# Patient Record
Sex: FEMALE | Race: WHITE | NOT HISPANIC OR LATINO | Employment: OTHER | URBAN - METROPOLITAN AREA
[De-identification: names, ages, dates, MRNs, and addresses within clinical notes are randomized per-mention and may not be internally consistent; named-entity substitution may affect disease eponyms.]

---

## 2023-07-06 ENCOUNTER — CLAIMS CREATED FROM THE CLAIM WINDOW (OUTPATIENT)
Dept: URBAN - METROPOLITAN AREA MEDICAL CENTER 19 | Facility: MEDICAL CENTER | Age: 78
End: 2023-07-06
Payer: MEDICARE

## 2023-07-06 DIAGNOSIS — R74.01 ABNORMAL/ELEVATED TRANSAMINASE (SGOT, AMINOTRANSFERASE): ICD-10-CM

## 2023-07-06 DIAGNOSIS — K83.1 OBSTRUCTION OF BILE DUCT: ICD-10-CM

## 2023-07-06 DIAGNOSIS — K83.8 COMMON BILE DUCT DILATION: ICD-10-CM

## 2023-07-06 DIAGNOSIS — R79.89 ABNORMAL BILIRUBIN TEST: ICD-10-CM

## 2023-07-06 DIAGNOSIS — R93.2 ABN FIND-BILIARY TRACT: ICD-10-CM

## 2023-07-06 DIAGNOSIS — R74.8 ABNORMAL ALKALINE PHOSPHATASE TEST: ICD-10-CM

## 2023-07-06 PROCEDURE — 99221 1ST HOSP IP/OBS SF/LOW 40: CPT | Performed by: INTERNAL MEDICINE

## 2023-07-06 PROCEDURE — 43274 ERCP DUCT STENT PLACEMENT: CPT | Performed by: INTERNAL MEDICINE

## 2023-07-07 ENCOUNTER — CLAIMS CREATED FROM THE CLAIM WINDOW (OUTPATIENT)
Dept: URBAN - METROPOLITAN AREA MEDICAL CENTER 19 | Facility: MEDICAL CENTER | Age: 78
End: 2023-07-07
Payer: MEDICARE

## 2023-07-07 DIAGNOSIS — R10.11 RUQ ABDOMINAL PAIN: ICD-10-CM

## 2023-07-07 DIAGNOSIS — R14.0 BLOAT: ICD-10-CM

## 2023-07-07 DIAGNOSIS — K83.09 ASCENDING CHOLANGITIS: ICD-10-CM

## 2023-07-07 DIAGNOSIS — K59.09 CHRONIC CONSTIPATION: ICD-10-CM

## 2023-07-07 PROCEDURE — 99232 SBSQ HOSP IP/OBS MODERATE 35: CPT | Performed by: STUDENT IN AN ORGANIZED HEALTH CARE EDUCATION/TRAINING PROGRAM

## 2023-07-08 ENCOUNTER — CLAIMS CREATED FROM THE CLAIM WINDOW (OUTPATIENT)
Dept: URBAN - METROPOLITAN AREA MEDICAL CENTER 19 | Facility: MEDICAL CENTER | Age: 78
End: 2023-07-08
Payer: MEDICARE

## 2023-07-08 DIAGNOSIS — D72.829 ELEVATED WHITE BLOOD CELL COUNT: ICD-10-CM

## 2023-07-08 DIAGNOSIS — K83.1 OBSTRUCTION OF BILE DUCT: ICD-10-CM

## 2023-07-08 DIAGNOSIS — R74.01 ABNORMAL/ELEVATED TRANSAMINASE (SGOT, AMINOTRANSFERASE): ICD-10-CM

## 2023-07-08 DIAGNOSIS — R79.89 ABNORMAL BILIRUBIN TEST: ICD-10-CM

## 2023-07-08 DIAGNOSIS — R74.8 ABNORMAL ALKALINE PHOSPHATASE TEST: ICD-10-CM

## 2023-07-08 PROCEDURE — 99232 SBSQ HOSP IP/OBS MODERATE 35: CPT | Performed by: INTERNAL MEDICINE

## 2023-07-09 ENCOUNTER — CLAIMS CREATED FROM THE CLAIM WINDOW (OUTPATIENT)
Dept: URBAN - METROPOLITAN AREA MEDICAL CENTER 19 | Facility: MEDICAL CENTER | Age: 78
End: 2023-07-09
Payer: MEDICARE

## 2023-07-09 DIAGNOSIS — K91.89 AFFERENT LOOP SYNDROME: ICD-10-CM

## 2023-07-09 DIAGNOSIS — K56.7 ILEUS: ICD-10-CM

## 2023-07-09 DIAGNOSIS — K85.80 OTHER ACUTE PANCREATITIS: ICD-10-CM

## 2023-07-09 DIAGNOSIS — K83.1 OBSTRUCTION OF BILE DUCT: ICD-10-CM

## 2023-07-09 PROCEDURE — 99233 SBSQ HOSP IP/OBS HIGH 50: CPT | Performed by: INTERNAL MEDICINE

## 2023-07-09 PROCEDURE — 99232 SBSQ HOSP IP/OBS MODERATE 35: CPT | Performed by: INTERNAL MEDICINE

## 2023-07-10 ENCOUNTER — CLAIMS CREATED FROM THE CLAIM WINDOW (OUTPATIENT)
Dept: URBAN - METROPOLITAN AREA MEDICAL CENTER 19 | Facility: MEDICAL CENTER | Age: 78
End: 2023-07-10
Payer: MEDICARE

## 2023-07-10 DIAGNOSIS — E80.6 HYPERBILIRUBINEMIA: ICD-10-CM

## 2023-07-10 DIAGNOSIS — E87.6 HYPOKALEMIA: ICD-10-CM

## 2023-07-10 DIAGNOSIS — K83.09 CHOLANGITIS: ICD-10-CM

## 2023-07-10 DIAGNOSIS — J96.01 ACUTE RESPIRATORY FAILURE WITH HYPOXEMIA: ICD-10-CM

## 2023-07-10 DIAGNOSIS — K85.01 IDIOPATHIC ACUTE PANCREATITIS WITH UNINFECTED NECROSIS: ICD-10-CM

## 2023-07-10 PROCEDURE — 99233 SBSQ HOSP IP/OBS HIGH 50: CPT | Performed by: STUDENT IN AN ORGANIZED HEALTH CARE EDUCATION/TRAINING PROGRAM

## 2023-07-10 PROCEDURE — 99232 SBSQ HOSP IP/OBS MODERATE 35: CPT | Performed by: STUDENT IN AN ORGANIZED HEALTH CARE EDUCATION/TRAINING PROGRAM

## 2023-07-11 ENCOUNTER — CLAIMS CREATED FROM THE CLAIM WINDOW (OUTPATIENT)
Dept: URBAN - METROPOLITAN AREA MEDICAL CENTER 19 | Facility: MEDICAL CENTER | Age: 78
End: 2023-07-11
Payer: MEDICARE

## 2023-07-11 DIAGNOSIS — K83.1 OBSTRUCTION OF BILE DUCT: ICD-10-CM

## 2023-07-11 DIAGNOSIS — K85.01 IDIOPATHIC ACUTE PANCREATITIS WITH UNINFECTED NECROSIS: ICD-10-CM

## 2023-07-11 DIAGNOSIS — N17.9 AKI (ACUTE KIDNEY INJURY): ICD-10-CM

## 2023-07-11 DIAGNOSIS — J96.01 ACUTE RESPIRATORY FAILURE WITH HYPOXEMIA: ICD-10-CM

## 2023-07-11 DIAGNOSIS — K83.09 ASCENDING CHOLANGITIS: ICD-10-CM

## 2023-07-11 PROCEDURE — 99232 SBSQ HOSP IP/OBS MODERATE 35: CPT | Performed by: STUDENT IN AN ORGANIZED HEALTH CARE EDUCATION/TRAINING PROGRAM

## 2023-07-11 PROCEDURE — 99233 SBSQ HOSP IP/OBS HIGH 50: CPT | Performed by: STUDENT IN AN ORGANIZED HEALTH CARE EDUCATION/TRAINING PROGRAM

## 2023-07-12 ENCOUNTER — CLAIMS CREATED FROM THE CLAIM WINDOW (OUTPATIENT)
Dept: URBAN - METROPOLITAN AREA MEDICAL CENTER 19 | Facility: MEDICAL CENTER | Age: 78
End: 2023-07-12
Payer: MEDICARE

## 2023-07-12 DIAGNOSIS — K85.80 OTHER ACUTE PANCREATITIS: ICD-10-CM

## 2023-07-12 DIAGNOSIS — K83.09 ASCENDING CHOLANGITIS: ICD-10-CM

## 2023-07-12 DIAGNOSIS — K91.89 OTHER POSTOPERATIVE COMPLICATION INVOLVING DIGESTIVE SYSTEM: ICD-10-CM

## 2023-07-12 DIAGNOSIS — J96.01 ACUTE RESPIRATORY FAILURE WITH HYPOXIA: ICD-10-CM

## 2023-07-12 DIAGNOSIS — K56.7 ILEUS: ICD-10-CM

## 2023-07-12 DIAGNOSIS — N17.9 ACUTE RENAL FAILURE: ICD-10-CM

## 2023-07-12 PROCEDURE — 99233 SBSQ HOSP IP/OBS HIGH 50: CPT | Performed by: INTERNAL MEDICINE

## 2023-07-12 PROCEDURE — 99232 SBSQ HOSP IP/OBS MODERATE 35: CPT | Performed by: INTERNAL MEDICINE

## 2023-07-13 ENCOUNTER — CLAIMS CREATED FROM THE CLAIM WINDOW (OUTPATIENT)
Dept: URBAN - METROPOLITAN AREA MEDICAL CENTER 19 | Facility: MEDICAL CENTER | Age: 78
End: 2023-07-13
Payer: MEDICARE

## 2023-07-13 DIAGNOSIS — K83.09 ASCENDING CHOLANGITIS: ICD-10-CM

## 2023-07-13 DIAGNOSIS — K86.89 ACUTE PANCREATIC FLUID COLLECTION: ICD-10-CM

## 2023-07-13 DIAGNOSIS — K85.81 OTHER ACUTE PANCREATITIS WITH UNINFECTED NECROSIS: ICD-10-CM

## 2023-07-13 DIAGNOSIS — R18.8 ASCITES: ICD-10-CM

## 2023-07-13 DIAGNOSIS — K56.7 ILEUS: ICD-10-CM

## 2023-07-13 DIAGNOSIS — K91.89 OTHER POSTOPERATIVE COMPLICATION INVOLVING DIGESTIVE SYSTEM: ICD-10-CM

## 2023-07-13 DIAGNOSIS — J96.01 ACUTE RESPIRATORY FAILURE WITH HYPOXIA: ICD-10-CM

## 2023-07-13 PROCEDURE — 99232 SBSQ HOSP IP/OBS MODERATE 35: CPT | Performed by: INTERNAL MEDICINE

## 2023-07-14 ENCOUNTER — CLAIMS CREATED FROM THE CLAIM WINDOW (OUTPATIENT)
Dept: URBAN - METROPOLITAN AREA MEDICAL CENTER 19 | Facility: MEDICAL CENTER | Age: 78
End: 2023-07-14
Payer: MEDICARE

## 2023-07-14 DIAGNOSIS — K91.89 OTH POSTPROCEDURAL COMPLICATIONS AND DISORDERS OF DGSTV SYS: ICD-10-CM

## 2023-07-14 DIAGNOSIS — K85.80 OTHER ACUTE PANCREATITIS: ICD-10-CM

## 2023-07-14 DIAGNOSIS — K83.1 OBSTRUCTION OF BILE DUCT: ICD-10-CM

## 2023-07-14 PROCEDURE — 99231 SBSQ HOSP IP/OBS SF/LOW 25: CPT | Performed by: INTERNAL MEDICINE

## 2023-07-15 ENCOUNTER — CLAIMS CREATED FROM THE CLAIM WINDOW (OUTPATIENT)
Dept: URBAN - METROPOLITAN AREA MEDICAL CENTER 19 | Facility: MEDICAL CENTER | Age: 78
End: 2023-07-15
Payer: MEDICARE

## 2023-07-15 DIAGNOSIS — K85.81 OTHER ACUTE PANCREATITIS WITH UNINFECTED NECROSIS: ICD-10-CM

## 2023-07-15 DIAGNOSIS — K83.1 OBSTRUCTION OF BILE DUCT: ICD-10-CM

## 2023-07-15 DIAGNOSIS — K91.89 OTHER POSTPROCEDURAL COMPLICATIONS AND DISORDERS OF DIGESTIVE SYSTEM: ICD-10-CM

## 2023-07-15 PROCEDURE — 99232 SBSQ HOSP IP/OBS MODERATE 35: CPT | Performed by: INTERNAL MEDICINE

## 2023-07-16 ENCOUNTER — CLAIMS CREATED FROM THE CLAIM WINDOW (OUTPATIENT)
Dept: URBAN - METROPOLITAN AREA MEDICAL CENTER 19 | Facility: MEDICAL CENTER | Age: 78
End: 2023-07-16
Payer: MEDICARE

## 2023-07-16 DIAGNOSIS — E86.1 HYPOVOLEMIA: ICD-10-CM

## 2023-07-16 DIAGNOSIS — K83.1 OBSTRUCTION OF BILE DUCT: ICD-10-CM

## 2023-07-16 DIAGNOSIS — K91.89 OTHER POSTPROCEDURAL COMPLICATIONS AND DISORDERS OF DIGESTIVE SYSTEM: ICD-10-CM

## 2023-07-16 DIAGNOSIS — K85.81 OTHER ACUTE PANCREATITIS WITH UNINFECTED NECROSIS: ICD-10-CM

## 2023-07-16 PROCEDURE — 99232 SBSQ HOSP IP/OBS MODERATE 35: CPT | Performed by: INTERNAL MEDICINE

## 2023-07-17 ENCOUNTER — CLAIMS CREATED FROM THE CLAIM WINDOW (OUTPATIENT)
Dept: URBAN - METROPOLITAN AREA MEDICAL CENTER 19 | Facility: MEDICAL CENTER | Age: 78
End: 2023-07-17
Payer: MEDICARE

## 2023-07-17 DIAGNOSIS — K85.80 OTHER ACUTE PANCREATITIS: ICD-10-CM

## 2023-07-17 DIAGNOSIS — R10.816 ABDOMINAL TENDERNESS, EPIGASTRIC: ICD-10-CM

## 2023-07-17 DIAGNOSIS — K83.09 CHOLANGITIS: ICD-10-CM

## 2023-07-17 DIAGNOSIS — K83.1 OBSTRUCTION OF BILE DUCT: ICD-10-CM

## 2023-07-17 DIAGNOSIS — K91.89 OTHER POSTOPERATIVE COMPLICATION INVOLVING DIGESTIVE SYSTEM: ICD-10-CM

## 2023-07-17 PROCEDURE — 99232 SBSQ HOSP IP/OBS MODERATE 35: CPT | Performed by: INTERNAL MEDICINE

## 2023-07-18 ENCOUNTER — CLAIMS CREATED FROM THE CLAIM WINDOW (OUTPATIENT)
Dept: URBAN - METROPOLITAN AREA MEDICAL CENTER 19 | Facility: MEDICAL CENTER | Age: 78
End: 2023-07-18
Payer: MEDICARE

## 2023-07-18 DIAGNOSIS — R19.7 DIARRHEA: ICD-10-CM

## 2023-07-18 DIAGNOSIS — K85.80 OTHER ACUTE PANCREATITIS: ICD-10-CM

## 2023-07-18 DIAGNOSIS — K91.89 OTHER POSTPROCEDURAL COMPLICATIONS AND DISORDERS OF DIGESTIVE SYSTEM: ICD-10-CM

## 2023-07-18 DIAGNOSIS — K83.1 OBSTRUCTION OF BILE DUCT: ICD-10-CM

## 2023-07-18 DIAGNOSIS — K83.09 CHOLANGITIS: ICD-10-CM

## 2023-07-18 PROCEDURE — 99232 SBSQ HOSP IP/OBS MODERATE 35: CPT | Performed by: INTERNAL MEDICINE

## 2023-07-19 ENCOUNTER — CLAIMS CREATED FROM THE CLAIM WINDOW (OUTPATIENT)
Dept: URBAN - METROPOLITAN AREA MEDICAL CENTER 19 | Facility: MEDICAL CENTER | Age: 78
End: 2023-07-19
Payer: MEDICARE

## 2023-07-19 DIAGNOSIS — R06.82 TACHYPNEA: ICD-10-CM

## 2023-07-19 DIAGNOSIS — K91.89 OTHER POSTOPERATIVE COMPLICATION INVOLVING DIGESTIVE SYSTEM: ICD-10-CM

## 2023-07-19 DIAGNOSIS — K83.1 OBSTRUCTION OF BILE DUCT: ICD-10-CM

## 2023-07-19 DIAGNOSIS — R19.7 DIARRHEA: ICD-10-CM

## 2023-07-19 DIAGNOSIS — K85.80 OTHER ACUTE PANCREATITIS: ICD-10-CM

## 2023-07-19 DIAGNOSIS — K83.09 CHOLANGITIS: ICD-10-CM

## 2023-07-19 PROCEDURE — 99232 SBSQ HOSP IP/OBS MODERATE 35: CPT | Performed by: INTERNAL MEDICINE

## 2023-07-20 ENCOUNTER — CLAIMS CREATED FROM THE CLAIM WINDOW (OUTPATIENT)
Dept: URBAN - METROPOLITAN AREA MEDICAL CENTER 19 | Facility: MEDICAL CENTER | Age: 78
End: 2023-07-20
Payer: MEDICARE

## 2023-07-20 DIAGNOSIS — K91.89 OTH POSTPROCEDURAL COMPLICATIONS AND DISORDERS OF DGSTV SYS: ICD-10-CM

## 2023-07-20 DIAGNOSIS — R19.7 DIARRHEA: ICD-10-CM

## 2023-07-20 DIAGNOSIS — K85.81 OTHER ACUTE PANCREATITIS WITH UNINFECTED NECROSIS: ICD-10-CM

## 2023-07-20 DIAGNOSIS — R34 ANURIA: ICD-10-CM

## 2023-07-20 DIAGNOSIS — K83.1 OBSTRUCTION OF BILE DUCT: ICD-10-CM

## 2023-07-20 DIAGNOSIS — J96.01 ACUTE RESPIRATORY FAILURE WITH HYPOXIA: ICD-10-CM

## 2023-07-20 PROCEDURE — 99232 SBSQ HOSP IP/OBS MODERATE 35: CPT | Performed by: INTERNAL MEDICINE

## 2023-07-21 ENCOUNTER — CLAIMS CREATED FROM THE CLAIM WINDOW (OUTPATIENT)
Dept: URBAN - METROPOLITAN AREA MEDICAL CENTER 19 | Facility: MEDICAL CENTER | Age: 78
End: 2023-07-21
Payer: MEDICARE

## 2023-07-21 DIAGNOSIS — K83.1 BILIARY OBSTRUCTION: ICD-10-CM

## 2023-07-21 DIAGNOSIS — D64.89 ANEMIA DUE TO OTHER CAUSE: ICD-10-CM

## 2023-07-21 DIAGNOSIS — K83.09 CHOLANGITIS: ICD-10-CM

## 2023-07-21 DIAGNOSIS — K91.89 OTHER POSTPROCEDURAL COMPLICATIONS AND DISORDERS OF DIGESTIVE SYSTEM: ICD-10-CM

## 2023-07-21 DIAGNOSIS — K85.80 OTHER ACUTE PANCREATITIS WITHOUT INFECTION OR NECROSIS: ICD-10-CM

## 2023-07-21 DIAGNOSIS — R19.7 DIARRHEA: ICD-10-CM

## 2023-07-21 PROCEDURE — 99232 SBSQ HOSP IP/OBS MODERATE 35: CPT | Performed by: INTERNAL MEDICINE

## 2023-07-22 ENCOUNTER — CLAIMS CREATED FROM THE CLAIM WINDOW (OUTPATIENT)
Dept: URBAN - METROPOLITAN AREA MEDICAL CENTER 19 | Facility: MEDICAL CENTER | Age: 78
End: 2023-07-22
Payer: MEDICARE

## 2023-07-22 DIAGNOSIS — K56.7 ILEUS: ICD-10-CM

## 2023-07-22 DIAGNOSIS — K83.1 BILIARY OBSTRUCTION: ICD-10-CM

## 2023-07-22 DIAGNOSIS — K85.80 OTHER ACUTE PANCREATITIS: ICD-10-CM

## 2023-07-22 DIAGNOSIS — K83.09 CHOLANGITIS: ICD-10-CM

## 2023-07-22 DIAGNOSIS — N17.9 AKI (ACUTE KIDNEY INJURY): ICD-10-CM

## 2023-07-22 PROCEDURE — 99232 SBSQ HOSP IP/OBS MODERATE 35: CPT | Performed by: INTERNAL MEDICINE

## 2023-07-23 ENCOUNTER — CLAIMS CREATED FROM THE CLAIM WINDOW (OUTPATIENT)
Dept: URBAN - METROPOLITAN AREA MEDICAL CENTER 19 | Facility: MEDICAL CENTER | Age: 78
End: 2023-07-23
Payer: MEDICARE

## 2023-07-23 DIAGNOSIS — K83.1 BILE DUCT STRICTURE: ICD-10-CM

## 2023-07-23 DIAGNOSIS — K56.7 ILEUS: ICD-10-CM

## 2023-07-23 DIAGNOSIS — K83.09 CHOLANGITIS: ICD-10-CM

## 2023-07-23 DIAGNOSIS — N17.9 AKI (ACUTE KIDNEY INJURY): ICD-10-CM

## 2023-07-23 DIAGNOSIS — K85.80 OTHER ACUTE PANCREATITIS: ICD-10-CM

## 2023-07-23 PROCEDURE — 99232 SBSQ HOSP IP/OBS MODERATE 35: CPT | Performed by: INTERNAL MEDICINE

## 2023-07-24 ENCOUNTER — CLAIMS CREATED FROM THE CLAIM WINDOW (OUTPATIENT)
Dept: URBAN - METROPOLITAN AREA MEDICAL CENTER 19 | Facility: MEDICAL CENTER | Age: 78
End: 2023-07-24
Payer: MEDICARE

## 2023-07-24 DIAGNOSIS — K56.7 ILEUS: ICD-10-CM

## 2023-07-24 DIAGNOSIS — K83.09 CHOLANGITIS: ICD-10-CM

## 2023-07-24 DIAGNOSIS — N17.9 AKI (ACUTE KIDNEY INJURY): ICD-10-CM

## 2023-07-24 DIAGNOSIS — K85.80 OTHER ACUTE PANCREATITIS: ICD-10-CM

## 2023-07-24 DIAGNOSIS — K91.89 OTHER POSTPROCEDURAL COMPLICATIONS AND DISORDERS OF DIGESTIVE SYSTEM: ICD-10-CM

## 2023-07-24 DIAGNOSIS — K83.1 OBSTRUCTION OF BILE DUCT: ICD-10-CM

## 2023-07-24 PROCEDURE — 99232 SBSQ HOSP IP/OBS MODERATE 35: CPT | Performed by: INTERNAL MEDICINE

## 2023-07-25 ENCOUNTER — CLAIMS CREATED FROM THE CLAIM WINDOW (OUTPATIENT)
Dept: URBAN - METROPOLITAN AREA MEDICAL CENTER 19 | Facility: MEDICAL CENTER | Age: 78
End: 2023-07-25
Payer: MEDICARE

## 2023-07-25 DIAGNOSIS — K91.89 OTH POSTPROCEDURAL COMPLICATIONS AND DISORDERS OF DGSTV SYS: ICD-10-CM

## 2023-07-25 DIAGNOSIS — K83.09 CHOLANGITIS: ICD-10-CM

## 2023-07-25 DIAGNOSIS — K83.1 BILIARY OBSTRUCTION: ICD-10-CM

## 2023-07-25 DIAGNOSIS — K85.80 OTHER ACUTE PANCREATITIS: ICD-10-CM

## 2023-07-25 PROCEDURE — 99232 SBSQ HOSP IP/OBS MODERATE 35: CPT | Performed by: INTERNAL MEDICINE

## 2023-07-26 ENCOUNTER — CLAIMS CREATED FROM THE CLAIM WINDOW (OUTPATIENT)
Dept: URBAN - METROPOLITAN AREA MEDICAL CENTER 19 | Facility: MEDICAL CENTER | Age: 78
End: 2023-07-26
Payer: MEDICARE

## 2023-07-26 DIAGNOSIS — K85.80 OTHER ACUTE PANCREATITIS: ICD-10-CM

## 2023-07-26 DIAGNOSIS — K83.1 OBSTRUCTION OF BILE DUCT: ICD-10-CM

## 2023-07-26 DIAGNOSIS — K91.89 OTH POSTPROCEDURAL COMPLICATIONS AND DISORDERS OF DGSTV SYS: ICD-10-CM

## 2023-07-26 DIAGNOSIS — K83.09 CHOLANGITIS: ICD-10-CM

## 2023-07-26 PROCEDURE — 99232 SBSQ HOSP IP/OBS MODERATE 35: CPT | Performed by: INTERNAL MEDICINE

## 2023-07-27 ENCOUNTER — CLAIMS CREATED FROM THE CLAIM WINDOW (OUTPATIENT)
Dept: URBAN - METROPOLITAN AREA MEDICAL CENTER 19 | Facility: MEDICAL CENTER | Age: 78
End: 2023-07-27
Payer: MEDICARE

## 2023-07-27 DIAGNOSIS — K83.09 CHOLANGITIS: ICD-10-CM

## 2023-07-27 DIAGNOSIS — K83.1 OBSTRUCTION OF BILE DUCT: ICD-10-CM

## 2023-07-27 DIAGNOSIS — K85.80 OTHER ACUTE PANCREATITIS: ICD-10-CM

## 2023-07-27 DIAGNOSIS — K91.89 OTH POSTPROCEDURAL COMPLICATIONS AND DISORDERS OF DGSTV SYS: ICD-10-CM

## 2023-07-27 PROCEDURE — 99232 SBSQ HOSP IP/OBS MODERATE 35: CPT | Performed by: INTERNAL MEDICINE

## 2023-07-28 ENCOUNTER — CLAIMS CREATED FROM THE CLAIM WINDOW (OUTPATIENT)
Dept: URBAN - METROPOLITAN AREA MEDICAL CENTER 19 | Facility: MEDICAL CENTER | Age: 78
End: 2023-07-28
Payer: MEDICARE

## 2023-07-28 DIAGNOSIS — K91.89 OTH POSTPROCEDURAL COMPLICATIONS AND DISORDERS OF DGSTV SYS: ICD-10-CM

## 2023-07-28 DIAGNOSIS — K83.1 BILIARY OBSTRUCTION: ICD-10-CM

## 2023-07-28 DIAGNOSIS — N18.6 ESRD (END STAGE RENAL DISEASE): ICD-10-CM

## 2023-07-28 DIAGNOSIS — K85.80 OTHER ACUTE PANCREATITIS: ICD-10-CM

## 2023-07-28 PROCEDURE — 99231 SBSQ HOSP IP/OBS SF/LOW 25: CPT | Performed by: INTERNAL MEDICINE

## 2023-07-30 ENCOUNTER — CLAIMS CREATED FROM THE CLAIM WINDOW (OUTPATIENT)
Dept: URBAN - METROPOLITAN AREA MEDICAL CENTER 19 | Facility: MEDICAL CENTER | Age: 78
End: 2023-07-30
Payer: MEDICARE

## 2023-07-30 DIAGNOSIS — K85.80 OTHER ACUTE PANCREATITIS: ICD-10-CM

## 2023-07-30 DIAGNOSIS — K83.1 OBSTRUCTION OF BILE DUCT: ICD-10-CM

## 2023-07-30 DIAGNOSIS — K91.89 OTH POSTPROCEDURAL COMPLICATIONS AND DISORDERS OF DGSTV SYS: ICD-10-CM

## 2023-07-30 PROCEDURE — 99231 SBSQ HOSP IP/OBS SF/LOW 25: CPT | Performed by: INTERNAL MEDICINE

## 2023-07-31 ENCOUNTER — TELEPHONE ENCOUNTER (OUTPATIENT)
Dept: URBAN - METROPOLITAN AREA CLINIC 113 | Facility: CLINIC | Age: 78
End: 2023-07-31

## 2023-07-31 ENCOUNTER — LAB OUTSIDE AN ENCOUNTER (OUTPATIENT)
Dept: URBAN - METROPOLITAN AREA CLINIC 113 | Facility: CLINIC | Age: 78
End: 2023-07-31

## 2023-08-07 ENCOUNTER — OFFICE VISIT (OUTPATIENT)
Dept: URBAN - METROPOLITAN AREA CLINIC 113 | Facility: CLINIC | Age: 78
End: 2023-08-07

## 2023-08-07 NOTE — HPI-TODAY'S VISIT:
78-year-old female with no past medical history presents for follow-up after hospitalization.  She was seen by Dr. Magana as inpatient consultation on 7/6/2023 for painless jaundice.  Elevation of LFTs, alk phos and total bilirubin were suspicious for obstruction in the biliary duct.  ERCP was recommended. Hospital ERCP 7/6/2023:Massive dilation of bile duct all the way down to the ampulla with edema of the ampulla.  This could indicate either stone wedged in a congenitally narrowed ampullary bile duct or small malignancy not obviously visualized on exam.  A 10 [place] 5 cm stent was placed.  Overnight IV antibiotics were recommended due to purulent material and bile duct.  Plans were to repeat ERCP in 6 to 8 weeks with possible combined EUS. C. difficile testing in the hospital was negative.  She developed severe post ERCP pancreatitis, SIRS syndrome, ROBBI due to ATN and started dialysis.  She was to be discharged to a nursing facility.  ERCP was recommended in 4 weeks for removal of stent with Dr. Magana.

## 2023-08-14 ENCOUNTER — LAB OUTSIDE AN ENCOUNTER (OUTPATIENT)
Dept: URBAN - METROPOLITAN AREA CLINIC 107 | Facility: CLINIC | Age: 78
End: 2023-08-14

## 2023-08-14 ENCOUNTER — WEB ENCOUNTER (OUTPATIENT)
Dept: URBAN - METROPOLITAN AREA CLINIC 107 | Facility: CLINIC | Age: 78
End: 2023-08-14

## 2023-08-14 ENCOUNTER — OFFICE VISIT (OUTPATIENT)
Dept: URBAN - METROPOLITAN AREA CLINIC 107 | Facility: CLINIC | Age: 78
End: 2023-08-14
Payer: MEDICARE

## 2023-08-14 VITALS
TEMPERATURE: 97.1 F | DIASTOLIC BLOOD PRESSURE: 66 MMHG | HEIGHT: 62 IN | WEIGHT: 116 LBS | BODY MASS INDEX: 21.35 KG/M2 | HEART RATE: 104 BPM | SYSTOLIC BLOOD PRESSURE: 111 MMHG | RESPIRATION RATE: 18 BRPM

## 2023-08-14 DIAGNOSIS — K83.1 OBSTRUCTIVE JAUNDICE: ICD-10-CM

## 2023-08-14 DIAGNOSIS — K80.50 CHOLEDOCHOLITHIASIS: ICD-10-CM

## 2023-08-14 DIAGNOSIS — K85.90 ACUTE PANCREATITIS WITHOUT NECROSIS OR INFECTION, UNSPECIFIED: ICD-10-CM

## 2023-08-14 DIAGNOSIS — K91.89 OTHER POSTPROCEDURAL COMPLICATIONS AND DISORDERS OF DIGESTIVE SYSTEM: ICD-10-CM

## 2023-08-14 PROCEDURE — 99214 OFFICE O/P EST MOD 30 MIN: CPT | Performed by: INTERNAL MEDICINE

## 2023-08-14 NOTE — EXAM-PHYSICAL EXAM
She is alert and oriented to person place and situation no acute distress.  There is no scleral icterus.  Abdomen is soft nondistended and nontender.  She has trace to 1+ lower extremity edema bilaterally.  Her daughter asked whether I could remove a bandage that was on her right buttocks.  With the daughter present I removed the bandage which was dated August 6.  The skin underneath was healed.  No evidence for skin breakdown in that area.

## 2023-08-14 NOTE — HPI-TODAY'S VISIT:
The patient is a very pleasant 78-year-old female who I saw in consultation on July 6 of this year with painless jaundice.  She had outside imaging suggesting common bile duct stones and a bile duct stricture.  She underwent ERCP on July 6.  She had massive dilation of the bile duct down to the ampulla.  No obvious stone was seen.  Biliary stent was placed.  Patient felt well that evening the next day the patient did have some bloating and constipation.  2 days after the procedure the patient developed SIRS consistent with severe posterior CP pancreatitis.  Patient surprisingly though did not have any abdominal pain.  Patient ended up requiring ventilation and dialysis support.  She then made slow steady improvement.  She was eventually discharged and transferred to rehab facility.  Last CT scan of the abdomen and pelvis was performed July 26.  This revealed moderate mesenteric edema and peripancreatic fluid collection. Laboratory testing August 1 revealed white cell count 11.1 hemoglobin 9.2 and platelet count of 231.  CRP was down to 4.5 on August 1 The patient states that she has been discharged from rehab.  She is also been discharged from dialysis.  She states she is doing very well.  Her only complaint is of a bandlike feeling across her right foot and some swelling of her right foot.  She states she is eating well without pain.  She and her family had numerous questions.

## 2023-09-12 ENCOUNTER — OFFICE VISIT (OUTPATIENT)
Dept: URBAN - METROPOLITAN AREA MEDICAL CENTER 19 | Facility: MEDICAL CENTER | Age: 78
End: 2023-09-12
Payer: MEDICARE

## 2023-09-12 DIAGNOSIS — K83.09 OTHER CHOLANGITIS: ICD-10-CM

## 2023-09-12 DIAGNOSIS — K29.80 DUODENITIS: ICD-10-CM

## 2023-09-12 DIAGNOSIS — K80.31 CHOLANGITIS DUE TO BILE DUCT CALCULUS WITH OBSTRUCTION: ICD-10-CM

## 2023-09-12 DIAGNOSIS — Z46.59 ENCOUNTER FOR CARE RELATED TO FEEDING TUBE: ICD-10-CM

## 2023-09-12 PROCEDURE — 43264 ERCP REMOVE DUCT CALCULI: CPT | Performed by: INTERNAL MEDICINE

## 2023-09-12 PROCEDURE — 992 APS NON BILLABLE: Performed by: INTERNAL MEDICINE

## 2023-09-12 PROCEDURE — 43275 ERCP REMOVE FORGN BODY DUCT: CPT | Performed by: INTERNAL MEDICINE

## 2023-09-12 PROCEDURE — 43276 ERCP STENT EXCHANGE W/DILATE: CPT | Performed by: INTERNAL MEDICINE

## 2023-09-12 PROCEDURE — 43274 ERCP DUCT STENT PLACEMENT: CPT | Performed by: INTERNAL MEDICINE

## 2023-09-13 ENCOUNTER — CLAIMS CREATED FROM THE CLAIM WINDOW (OUTPATIENT)
Dept: URBAN - METROPOLITAN AREA MEDICAL CENTER 19 | Facility: MEDICAL CENTER | Age: 78
End: 2023-09-13
Payer: MEDICARE

## 2023-09-13 DIAGNOSIS — K86.3 PSEUDOCYST OF PANCREAS: ICD-10-CM

## 2023-09-13 DIAGNOSIS — R10.816 ABDOMINAL TENDERNESS, EPIGASTRIC: ICD-10-CM

## 2023-09-13 DIAGNOSIS — R14.0 BLOAT: ICD-10-CM

## 2023-09-13 DIAGNOSIS — K83.09 OTHER CHOLANGITIS: ICD-10-CM

## 2023-09-13 DIAGNOSIS — K75.0 ABSCESS OF LIVER: ICD-10-CM

## 2023-09-13 PROCEDURE — 99232 SBSQ HOSP IP/OBS MODERATE 35: CPT | Performed by: INTERNAL MEDICINE

## 2023-09-13 PROCEDURE — 99214 OFFICE O/P EST MOD 30 MIN: CPT | Performed by: INTERNAL MEDICINE

## 2023-09-14 ENCOUNTER — CLAIMS CREATED FROM THE CLAIM WINDOW (OUTPATIENT)
Dept: URBAN - METROPOLITAN AREA MEDICAL CENTER 19 | Facility: MEDICAL CENTER | Age: 78
End: 2023-09-14
Payer: MEDICARE

## 2023-09-14 DIAGNOSIS — K83.1 AMPULLA OF VATER OBSTRUCTION SYNDROME: ICD-10-CM

## 2023-09-14 DIAGNOSIS — K83.09 OTHER CHOLANGITIS: ICD-10-CM

## 2023-09-14 DIAGNOSIS — K75.0 ABSCESS OF LIVER: ICD-10-CM

## 2023-09-14 DIAGNOSIS — K86.3 PSEUDOCYST OF PANCREAS: ICD-10-CM

## 2023-09-14 PROCEDURE — 99214 OFFICE O/P EST MOD 30 MIN: CPT | Performed by: INTERNAL MEDICINE

## 2023-09-14 PROCEDURE — 99232 SBSQ HOSP IP/OBS MODERATE 35: CPT | Performed by: INTERNAL MEDICINE

## 2023-09-15 ENCOUNTER — CLAIMS CREATED FROM THE CLAIM WINDOW (OUTPATIENT)
Dept: URBAN - METROPOLITAN AREA MEDICAL CENTER 19 | Facility: MEDICAL CENTER | Age: 78
End: 2023-09-15
Payer: MEDICARE

## 2023-09-15 ENCOUNTER — TELEPHONE ENCOUNTER (OUTPATIENT)
Dept: URBAN - METROPOLITAN AREA CLINIC 113 | Facility: CLINIC | Age: 78
End: 2023-09-15

## 2023-09-15 DIAGNOSIS — K83.09 OTHER CHOLANGITIS: ICD-10-CM

## 2023-09-15 DIAGNOSIS — K83.1 BILE DUCT STRICTURE: ICD-10-CM

## 2023-09-15 DIAGNOSIS — K75.0 ABSCESS OF LIVER: ICD-10-CM

## 2023-09-15 DIAGNOSIS — K86.3 PSEUDOCYST OF PANCREAS: ICD-10-CM

## 2023-09-15 PROCEDURE — 99232 SBSQ HOSP IP/OBS MODERATE 35: CPT | Performed by: INTERNAL MEDICINE

## 2023-09-15 PROCEDURE — 99214 OFFICE O/P EST MOD 30 MIN: CPT | Performed by: INTERNAL MEDICINE

## 2023-09-18 ENCOUNTER — LAB OUTSIDE AN ENCOUNTER (OUTPATIENT)
Dept: URBAN - METROPOLITAN AREA CLINIC 113 | Facility: CLINIC | Age: 78
End: 2023-09-18

## 2023-09-18 ENCOUNTER — TELEPHONE ENCOUNTER (OUTPATIENT)
Dept: URBAN - METROPOLITAN AREA CLINIC 113 | Facility: CLINIC | Age: 78
End: 2023-09-18

## 2023-09-18 PROBLEM — 197456007: Status: ACTIVE | Noted: 2023-09-18

## 2023-09-18 PROBLEM — 43797002: Status: ACTIVE | Noted: 2023-09-18

## 2023-09-18 PROBLEM — 59848001: Status: ACTIVE | Noted: 2023-09-18

## 2023-09-26 ENCOUNTER — TELEPHONE ENCOUNTER (OUTPATIENT)
Dept: URBAN - METROPOLITAN AREA CLINIC 113 | Facility: CLINIC | Age: 78
End: 2023-09-26

## 2023-09-26 RX ORDER — PANCRELIPASE LIPASE, PANCRELIPASE PROTEASE, PANCRELIPASE AMYLASE 10000; 32000; 42000 [USP'U]/1; [USP'U]/1; [USP'U]/1
2 CAPSULES CAPSULE, DELAYED RELEASE ORAL
Qty: 180 | Refills: 3 | OUTPATIENT
Start: 2023-09-27 | End: 2024-01-24

## 2023-09-28 ENCOUNTER — LAB OUTSIDE AN ENCOUNTER (OUTPATIENT)
Dept: URBAN - METROPOLITAN AREA CLINIC 107 | Facility: CLINIC | Age: 78
End: 2023-09-28

## 2023-09-28 ENCOUNTER — TELEPHONE ENCOUNTER (OUTPATIENT)
Dept: URBAN - METROPOLITAN AREA CLINIC 107 | Facility: CLINIC | Age: 78
End: 2023-09-28

## 2023-09-28 RX ORDER — PANCRELIPASE 24000; 76000; 120000 [USP'U]/1; [USP'U]/1; [USP'U]/1
2 CAPSULES CAPSULE, DELAYED RELEASE PELLETS ORAL
Qty: 180 | Refills: 0 | OUTPATIENT
Start: 2023-09-28 | End: 2023-10-28

## 2023-09-30 ENCOUNTER — CLAIMS CREATED FROM THE CLAIM WINDOW (OUTPATIENT)
Dept: URBAN - METROPOLITAN AREA MEDICAL CENTER 19 | Facility: MEDICAL CENTER | Age: 78
End: 2023-09-30
Payer: MEDICARE

## 2023-09-30 DIAGNOSIS — R93.3 ABN FINDINGS-GI TRACT: ICD-10-CM

## 2023-09-30 DIAGNOSIS — K91.841 POSTPROCEDURAL HEMORRHAGE OF A DIGESTIVE SYSTEM ORGAN OR STRUCTURE FOLLOWING OTHER PROCEDURE: ICD-10-CM

## 2023-09-30 PROCEDURE — 99254 IP/OBS CNSLTJ NEW/EST MOD 60: CPT | Performed by: INTERNAL MEDICINE

## 2023-09-30 PROCEDURE — 99222 1ST HOSP IP/OBS MODERATE 55: CPT | Performed by: INTERNAL MEDICINE

## 2023-10-01 ENCOUNTER — CLAIMS CREATED FROM THE CLAIM WINDOW (OUTPATIENT)
Dept: URBAN - METROPOLITAN AREA MEDICAL CENTER 19 | Facility: MEDICAL CENTER | Age: 78
End: 2023-10-01
Payer: MEDICARE

## 2023-10-01 DIAGNOSIS — R14.0 DISTENDED ABDOMEN: ICD-10-CM

## 2023-10-01 DIAGNOSIS — K91.841 POSTPROCEDURAL HEMORRHAGE OF A DIGESTIVE SYSTEM ORGAN OR STRUCTURE FOLLOWING OTHER PROCEDURE: ICD-10-CM

## 2023-10-01 DIAGNOSIS — K56.699 OTHER SPECIFIED INTESTINAL OBSTRUCTION, UNSPECIFIED WHETHER PARTIAL OR COMPLETE: ICD-10-CM

## 2023-10-01 PROCEDURE — 99232 SBSQ HOSP IP/OBS MODERATE 35: CPT | Performed by: INTERNAL MEDICINE

## 2023-10-12 ENCOUNTER — OFFICE VISIT (OUTPATIENT)
Dept: URBAN - METROPOLITAN AREA CLINIC 113 | Facility: CLINIC | Age: 78
End: 2023-10-12

## 2023-10-16 ENCOUNTER — TELEPHONE ENCOUNTER (OUTPATIENT)
Dept: URBAN - METROPOLITAN AREA CLINIC 113 | Facility: CLINIC | Age: 78
End: 2023-10-16

## 2023-10-18 ENCOUNTER — TELEPHONE ENCOUNTER (OUTPATIENT)
Dept: URBAN - METROPOLITAN AREA CLINIC 107 | Facility: CLINIC | Age: 78
End: 2023-10-18

## 2023-10-31 ENCOUNTER — TELEPHONE ENCOUNTER (OUTPATIENT)
Dept: URBAN - METROPOLITAN AREA CLINIC 113 | Facility: CLINIC | Age: 78
End: 2023-10-31

## 2023-10-31 ENCOUNTER — CLAIMS CREATED FROM THE CLAIM WINDOW (OUTPATIENT)
Dept: URBAN - METROPOLITAN AREA CLINIC 107 | Facility: CLINIC | Age: 78
End: 2023-10-31
Payer: MEDICARE

## 2023-10-31 VITALS
WEIGHT: 112.4 LBS | DIASTOLIC BLOOD PRESSURE: 63 MMHG | HEIGHT: 62 IN | HEART RATE: 107 BPM | BODY MASS INDEX: 20.68 KG/M2 | TEMPERATURE: 97.3 F | SYSTOLIC BLOOD PRESSURE: 104 MMHG

## 2023-10-31 DIAGNOSIS — K83.1 BILE DUCT STRICTURE: ICD-10-CM

## 2023-10-31 DIAGNOSIS — K80.50 CHOLEDOCHOLITHIASIS: ICD-10-CM

## 2023-10-31 DIAGNOSIS — K56.609 SMALL BOWEL OBSTRUCTION: ICD-10-CM

## 2023-10-31 DIAGNOSIS — K85.90 ACUTE PANCREATITIS, UNSPECIFIED COMPLICATION STATUS, UNSPECIFIED PANCREATITIS TYPE: ICD-10-CM

## 2023-10-31 DIAGNOSIS — K91.89 OTHER POSTPROCEDURAL COMPLICATIONS AND DISORDERS OF DIGESTIVE SYSTEM: ICD-10-CM

## 2023-10-31 DIAGNOSIS — K85.90 ACUTE PANCREATITIS: ICD-10-CM

## 2023-10-31 DIAGNOSIS — K83.1 OBSTRUCTIVE JAUNDICE: ICD-10-CM

## 2023-10-31 DIAGNOSIS — K75.0 HEPATIC ABSCESS: ICD-10-CM

## 2023-10-31 PROCEDURE — 99214 OFFICE O/P EST MOD 30 MIN: CPT | Performed by: INTERNAL MEDICINE

## 2023-10-31 RX ORDER — UREA 10 %
TAKE ONE TABLET BY MOUTH EVERY DAY LOTION (ML) TOPICAL
Qty: 30 EACH | Refills: 0 | Status: ACTIVE | COMMUNITY

## 2023-10-31 RX ORDER — PANCRELIPASE LIPASE, PANCRELIPASE PROTEASE, PANCRELIPASE AMYLASE 10000; 32000; 42000 [USP'U]/1; [USP'U]/1; [USP'U]/1
2 CAPSULES CAPSULE, DELAYED RELEASE ORAL
Qty: 180 | Refills: 3 | Status: ACTIVE | COMMUNITY
Start: 2023-09-27 | End: 2024-01-24

## 2023-10-31 RX ORDER — DOCUSATE SODIUM 100 MG/1
TAKE ONE CAPSULE BY MOUTH IN THE MORNING AND BEFORE BEDTIME DAILY CAPSULE, LIQUID FILLED ORAL
Qty: 60 EACH | Refills: 0 | Status: ACTIVE | COMMUNITY

## 2023-10-31 RX ORDER — MICAFUNGIN SODIUM 100 MG/1
INJECTION, POWDER, LYOPHILIZED, FOR SOLUTION INTRAVENOUS
Qty: 12 EACH | Status: ACTIVE | COMMUNITY

## 2023-10-31 RX ORDER — POLYETHYLENE GLYCOL 3350 17 G/17G
MIX 17G AS DIRECTED AND TAKE BY MOUTH DAILY POWDER, FOR SOLUTION ORAL
Qty: 510 GRAM | Refills: 0 | Status: ACTIVE | COMMUNITY

## 2023-10-31 RX ORDER — ERTAPENEM SODIUM 1 G/1
INJECTION, POWDER, LYOPHILIZED, FOR SOLUTION INTRAMUSCULAR; INTRAVENOUS
Qty: 12 EACH | Status: ACTIVE | COMMUNITY

## 2023-10-31 RX ORDER — ALPRAZOLAM 0.25 MG/1
TABLET ORAL
Qty: 20 TABLET | Status: ACTIVE | COMMUNITY

## 2023-10-31 NOTE — EXAM-PHYSICAL EXAM
She is alert and oriented to person place and situation no acute distress.  There is no scleral icterus.  She is somewhat pale in appearance.

## 2023-10-31 NOTE — HPI-TODAY'S VISIT:
The patient is a very pleasant 78-year-old female who I saw in consultation on July 6 of this year with painless jaundice.  She had outside imaging suggesting common bile duct stones and a bile duct stricture.  She underwent ERCP on July 6.  She had massive dilation of the bile duct down to the ampulla.  No obvious stone was seen.  Biliary stent was placed.  Patient felt well that evening the next day the patient did have some bloating and constipation.  2 days after the procedure the patient developed SIRS consistent with severe posterior CP pancreatitis.  Patient surprisingly though did not have any abdominal pain.  Patient ended up requiring ventilation and dialysis support.  She then made slow steady improvement.  She was eventually discharged and transferred to rehab facility.  Last CT scan of the abdomen and pelvis was performed July 26.  This revealed moderate mesenteric edema and peripancreatic fluid collection. Laboratory testing August 1 revealed white cell count 11.1 hemoglobin 9.2 and platelet count of 231.  CRP was down to 4.5 on August 1 The patient states that she has been discharged from rehab.  She is also been discharged from dialysis.  She states she is doing very well.  Her only complaint is of a bandlike feeling across her right foot and some swelling of her right foot.  She states she is eating well without pain.  She and her family had numerous questions. Interval history, 10/31/2023.  Patient underwent ERCP on September 12.  She was found to have purulent material consistent with cholangitis.  Because of her feeling poorly she was admitted for further evaluation.  Biopsies of the ampulla the time of ERCP were negative for malignancy.  No obvious malignancy was seen.  Patient was found on imaging studies on that admission to have evidence for small hepatic abscesses.  She was followed by infectious disease.  Infectious disease repeated CT scans twice.  The first being September 29.  These had findings concerning for small bowel obstruction with decreased size of pancreatic pseudocysts increased number of hypodensities within the liver.  Repeat CT scan was performed on October 27.  This revealed interval increase in size of multifocal hepatic hypodensities most concerning for abscesses.  Slightly less dilated small bowel loops with thickening of the small bowel wall in the lower abdomen.  Stable pancreatic pseudocyst. Most recent laboratory testing from October 5 of this year revealed white cell count 10.5 hemoglobin 10.8.  Platelet count 264.  BMP revealed a sodium 134 otherwise normal BMP. The patient states she has intermittent bloating.  Otherwise she feels reasonably well other than fatigue.  She had laboratory testing done yesterday which I do not have the results of as of yet.  She is scheduled for EUS in 2 weeks.

## 2023-11-09 ENCOUNTER — LAB OUTSIDE AN ENCOUNTER (OUTPATIENT)
Dept: URBAN - METROPOLITAN AREA CLINIC 113 | Facility: CLINIC | Age: 78
End: 2023-11-09

## 2023-11-09 ENCOUNTER — TELEPHONE ENCOUNTER (OUTPATIENT)
Dept: URBAN - METROPOLITAN AREA CLINIC 107 | Facility: CLINIC | Age: 78
End: 2023-11-09

## 2023-11-09 ENCOUNTER — TELEPHONE ENCOUNTER (OUTPATIENT)
Dept: URBAN - METROPOLITAN AREA CLINIC 113 | Facility: CLINIC | Age: 78
End: 2023-11-09

## 2023-11-09 PROBLEM — 700423003: Status: ACTIVE | Noted: 2023-11-09

## 2023-11-16 ENCOUNTER — OFFICE VISIT (OUTPATIENT)
Dept: URBAN - METROPOLITAN AREA MEDICAL CENTER 19 | Facility: MEDICAL CENTER | Age: 78
End: 2023-11-16

## 2023-12-12 ENCOUNTER — TELEPHONE ENCOUNTER (OUTPATIENT)
Dept: URBAN - METROPOLITAN AREA CLINIC 107 | Facility: CLINIC | Age: 78
End: 2023-12-12

## 2023-12-21 ENCOUNTER — TELEPHONE ENCOUNTER (OUTPATIENT)
Dept: URBAN - METROPOLITAN AREA CLINIC 113 | Facility: CLINIC | Age: 78
End: 2023-12-21

## 2023-12-27 ENCOUNTER — TELEPHONE ENCOUNTER (OUTPATIENT)
Dept: URBAN - METROPOLITAN AREA CLINIC 113 | Facility: CLINIC | Age: 78
End: 2023-12-27

## 2023-12-28 ENCOUNTER — OFFICE VISIT (OUTPATIENT)
Dept: URBAN - METROPOLITAN AREA MEDICAL CENTER 2 | Facility: MEDICAL CENTER | Age: 78
End: 2023-12-28
Payer: MEDICARE

## 2023-12-28 ENCOUNTER — TELEPHONE ENCOUNTER (OUTPATIENT)
Dept: URBAN - METROPOLITAN AREA CLINIC 113 | Facility: CLINIC | Age: 78
End: 2023-12-28

## 2023-12-28 DIAGNOSIS — Z12.11 COLON CANCER SCREENING: ICD-10-CM

## 2023-12-28 PROCEDURE — 992 APS NON BILLABLE: Performed by: INTERNAL MEDICINE

## 2023-12-28 RX ORDER — DOCUSATE SODIUM 100 MG/1
TAKE ONE CAPSULE BY MOUTH IN THE MORNING AND BEFORE BEDTIME DAILY CAPSULE, LIQUID FILLED ORAL
Qty: 60 EACH | Refills: 0 | Status: ACTIVE | COMMUNITY

## 2023-12-28 RX ORDER — POLYETHYLENE GLYCOL 3350 17 G/17G
MIX 17G AS DIRECTED AND TAKE BY MOUTH DAILY POWDER, FOR SOLUTION ORAL
Qty: 510 GRAM | Refills: 0 | Status: ACTIVE | COMMUNITY

## 2023-12-28 RX ORDER — ALPRAZOLAM 0.25 MG/1
TABLET ORAL
Qty: 20 TABLET | Status: ACTIVE | COMMUNITY

## 2023-12-28 RX ORDER — MICAFUNGIN SODIUM 100 MG/1
INJECTION, POWDER, LYOPHILIZED, FOR SOLUTION INTRAVENOUS
Qty: 12 EACH | Status: ACTIVE | COMMUNITY

## 2023-12-28 RX ORDER — UREA 10 %
TAKE ONE TABLET BY MOUTH EVERY DAY LOTION (ML) TOPICAL
Qty: 30 EACH | Refills: 0 | Status: ACTIVE | COMMUNITY

## 2023-12-28 RX ORDER — ERTAPENEM SODIUM 1 G/1
INJECTION, POWDER, LYOPHILIZED, FOR SOLUTION INTRAMUSCULAR; INTRAVENOUS
Qty: 12 EACH | Status: ACTIVE | COMMUNITY

## 2023-12-28 RX ORDER — PANCRELIPASE LIPASE, PANCRELIPASE PROTEASE, PANCRELIPASE AMYLASE 10000; 32000; 42000 [USP'U]/1; [USP'U]/1; [USP'U]/1
2 CAPSULES CAPSULE, DELAYED RELEASE ORAL
Qty: 180 | Refills: 3 | Status: ACTIVE | COMMUNITY
Start: 2023-09-27 | End: 2024-01-24

## 2024-01-16 ENCOUNTER — TELEPHONE ENCOUNTER (OUTPATIENT)
Dept: URBAN - METROPOLITAN AREA CLINIC 113 | Facility: CLINIC | Age: 79
End: 2024-01-16

## 2024-01-17 ENCOUNTER — OFFICE VISIT (OUTPATIENT)
Dept: URBAN - METROPOLITAN AREA CLINIC 113 | Facility: CLINIC | Age: 79
End: 2024-01-17

## 2024-01-29 ENCOUNTER — LAB OUTSIDE AN ENCOUNTER (OUTPATIENT)
Dept: URBAN - METROPOLITAN AREA CLINIC 113 | Facility: CLINIC | Age: 79
End: 2024-01-29

## 2024-01-29 ENCOUNTER — TELEPHONE ENCOUNTER (OUTPATIENT)
Dept: URBAN - METROPOLITAN AREA CLINIC 113 | Facility: CLINIC | Age: 79
End: 2024-01-29

## 2024-02-05 ENCOUNTER — OV EP (OUTPATIENT)
Dept: URBAN - METROPOLITAN AREA CLINIC 113 | Facility: CLINIC | Age: 79
End: 2024-02-05
Payer: MEDICARE

## 2024-02-05 ENCOUNTER — LAB (OUTPATIENT)
Dept: URBAN - METROPOLITAN AREA CLINIC 113 | Facility: CLINIC | Age: 79
End: 2024-02-05

## 2024-02-05 VITALS
RESPIRATION RATE: 18 BRPM | HEIGHT: 62 IN | SYSTOLIC BLOOD PRESSURE: 122 MMHG | WEIGHT: 105 LBS | BODY MASS INDEX: 19.32 KG/M2 | TEMPERATURE: 97.1 F | HEART RATE: 103 BPM | DIASTOLIC BLOOD PRESSURE: 70 MMHG

## 2024-02-05 DIAGNOSIS — K80.50 CHOLEDOCHOLITHIASIS: ICD-10-CM

## 2024-02-05 DIAGNOSIS — C25.9 PANCREATIC ADENOCARCINOMA: ICD-10-CM

## 2024-02-05 DIAGNOSIS — K85.80 ACUTE TRAUMATIC PANCREATITIS: ICD-10-CM

## 2024-02-05 DIAGNOSIS — K83.1 BILE DUCT STRICTURE: ICD-10-CM

## 2024-02-05 PROCEDURE — 99214 OFFICE O/P EST MOD 30 MIN: CPT | Performed by: INTERNAL MEDICINE

## 2024-02-05 RX ORDER — PANCRELIPASE LIPASE, PANCRELIPASE PROTEASE, PANCRELIPASE AMYLASE 25000; 79000; 105000 [USP'U]/1; [USP'U]/1; [USP'U]/1
2 TABLETS CAPSULE, DELAYED RELEASE ORAL
Qty: 540 | Refills: 3 | OUTPATIENT
Start: 2024-02-05 | End: 2025-01-30

## 2024-02-05 RX ORDER — ASPIRIN 81 MG/1
1 TABLET TABLET, CHEWABLE ORAL ONCE A DAY
Status: ACTIVE | COMMUNITY

## 2024-02-05 RX ORDER — CLOPIDOGREL BISULFATE 75 MG/1
1 TABLET TABLET ORAL ONCE A DAY
Status: ACTIVE | COMMUNITY

## 2024-02-05 RX ORDER — ERTAPENEM SODIUM 1 G/1
INJECTION, POWDER, LYOPHILIZED, FOR SOLUTION INTRAMUSCULAR; INTRAVENOUS
Qty: 12 EACH | Status: ACTIVE | COMMUNITY

## 2024-02-05 RX ORDER — DOCUSATE SODIUM 100 MG/1
TAKE ONE CAPSULE BY MOUTH IN THE MORNING AND BEFORE BEDTIME DAILY CAPSULE, LIQUID FILLED ORAL
Qty: 60 EACH | Refills: 0 | Status: ACTIVE | COMMUNITY

## 2024-02-05 RX ORDER — ATORVASTATIN CALCIUM 40 MG/1
1 TABLET TABLET, FILM COATED ORAL ONCE A DAY
Status: ACTIVE | COMMUNITY

## 2024-02-05 RX ORDER — MICAFUNGIN SODIUM 100 MG/1
INJECTION, POWDER, LYOPHILIZED, FOR SOLUTION INTRAVENOUS
Qty: 12 EACH | Status: ACTIVE | COMMUNITY

## 2024-02-05 RX ORDER — UREA 10 %
TAKE ONE TABLET BY MOUTH EVERY DAY LOTION (ML) TOPICAL
Qty: 30 EACH | Refills: 0 | Status: ACTIVE | COMMUNITY

## 2024-02-05 RX ORDER — ALPRAZOLAM 0.25 MG/1
TABLET ORAL
Qty: 20 TABLET | Status: ACTIVE | COMMUNITY

## 2024-02-05 RX ORDER — POLYETHYLENE GLYCOL 3350 17 G/17G
MIX 17G AS DIRECTED AND TAKE BY MOUTH DAILY POWDER, FOR SOLUTION ORAL
Qty: 510 GRAM | Refills: 0 | Status: ACTIVE | COMMUNITY

## 2024-02-05 NOTE — EXAM-PHYSICAL EXAM
She is alert and oriented to person place and situation no acute distress.  There is mild scleral icterus.  Abdomen is soft nondistended but there is slight fullness to the lower abdomen.  No tenderness.  No masses.

## 2024-02-05 NOTE — HPI-TODAY'S VISIT:
The patient is a very pleasant 78-year-old female who I saw in consultation on July 6 of this year with painless jaundice.  She had outside imaging suggesting common bile duct stones and a bile duct stricture.  She underwent ERCP on July 6.  She had massive dilation of the bile duct down to the ampulla.  No obvious stone was seen.  Biliary stent was placed.  Patient felt well that evening the next day the patient did have some bloating and constipation.  2 days after the procedure the patient developed SIRS consistent with severe posterior CP pancreatitis.  Patient surprisingly though did not have any abdominal pain.  Patient ended up requiring ventilation and dialysis support.  She then made slow steady improvement.  She was eventually discharged and transferred to rehab facility.  Last CT scan of the abdomen and pelvis was performed July 26.  This revealed moderate mesenteric edema and peripancreatic fluid collection. Laboratory testing August 1 revealed white cell count 11.1 hemoglobin 9.2 and platelet count of 231.  CRP was down to 4.5 on August 1 The patient states that she has been discharged from rehab.  She is also been discharged from dialysis.  She states she is doing very well.  Her only complaint is of a bandlike feeling across her right foot and some swelling of her right foot.  She states she is eating well without pain.  She and her family had numerous questions. Interval history, 10/31/2023.  Patient underwent ERCP on September 12.  She was found to have purulent material consistent with cholangitis.  Because of her feeling poorly she was admitted for further evaluation.  Biopsies of the ampulla the time of ERCP were negative for malignancy.  No obvious malignancy was seen.  Patient was found on imaging studies on that admission to have evidence for small hepatic abscesses.  She was followed by infectious disease.  Infectious disease repeated CT scans twice.  The first being September 29.  These had findings concerning for small bowel obstruction with decreased size of pancreatic pseudocysts increased number of hypodensities within the liver.  Repeat CT scan was performed on October 27.  This revealed interval increase in size of multifocal hepatic hypodensities most concerning for abscesses.  Slightly less dilated small bowel loops with thickening of the small bowel wall in the lower abdomen.  Stable pancreatic pseudocyst. Most recent laboratory testing from October 5 of this year revealed white cell count 10.5 hemoglobin 10.8.  Platelet count 264.  BMP revealed a sodium 134 otherwise normal BMP. The patient states she has intermittent bloating.  Otherwise she feels reasonably well other than fatigue.  She had laboratory testing done yesterday which I do not have the results of as of yet.  She is scheduled for EUS in 2 weeks. Interval history, 2/5/2024.  Patient was supposed to undergo repeat ERCP but when she arrived she had stated that she had had a cerebrovascular accident within 2 weeks prior to this and the anesthesia services felt that this would be too risky to proceed with ERCP at that time.  Referring records were reviewed.  Oncology note from January 28, 2024 was reviewed.  Laboratory testing from January 9, 2024 revealed a hemoglobin of 7.9.  White cell count 5.4 platelet count of 356.  CMP did reveal an elevated alkaline phosphatase at 604 AST 41 ALT 55.  Total bilirubin was 0.6.  BUN 11 creatinine 0.5. The patient states currently she has a complaint of occasional low back pain and on top of that some bloating especially in her lower abdomen.  This has been improved since she started MiraLAX and having good bowel movements.  She remains on chemotherapy.  She has an appointment with the neurologist next week.  Her ERCP is scheduled at the end of the month.

## 2024-02-26 ENCOUNTER — LAB (OUTPATIENT)
Dept: URBAN - METROPOLITAN AREA MEDICAL CENTER 19 | Facility: MEDICAL CENTER | Age: 79
End: 2024-02-26
Payer: MEDICARE

## 2024-02-26 ENCOUNTER — ERCP (OUTPATIENT)
Dept: URBAN - METROPOLITAN AREA MEDICAL CENTER 19 | Facility: MEDICAL CENTER | Age: 79
End: 2024-02-26

## 2024-02-26 DIAGNOSIS — K83.09 OTHER CHOLANGITIS: ICD-10-CM

## 2024-02-26 DIAGNOSIS — C25.9 MALIGNANT NEOPLASM OF PANCREAS, UNSPECIFIED: ICD-10-CM

## 2024-02-26 DIAGNOSIS — C80.1 MALIGNANT (PRIMARY) NEOPLASM, UNSPECIFIED: ICD-10-CM

## 2024-02-26 DIAGNOSIS — K83.1 OBSTRUCTION OF BILE DUCT: ICD-10-CM

## 2024-02-26 PROCEDURE — 43276 ERCP STENT EXCHANGE W/DILATE: CPT | Performed by: INTERNAL MEDICINE

## 2024-02-26 RX ORDER — ALPRAZOLAM 0.25 MG/1
TABLET ORAL
Qty: 20 TABLET | Status: ACTIVE | COMMUNITY

## 2024-02-26 RX ORDER — DOCUSATE SODIUM 100 MG/1
TAKE ONE CAPSULE BY MOUTH IN THE MORNING AND BEFORE BEDTIME DAILY CAPSULE, LIQUID FILLED ORAL
Qty: 60 EACH | Refills: 0 | Status: ACTIVE | COMMUNITY

## 2024-02-26 RX ORDER — MICAFUNGIN SODIUM 100 MG/1
INJECTION, POWDER, LYOPHILIZED, FOR SOLUTION INTRAVENOUS
Qty: 12 EACH | Status: ACTIVE | COMMUNITY

## 2024-02-26 RX ORDER — PANCRELIPASE LIPASE, PANCRELIPASE PROTEASE, PANCRELIPASE AMYLASE 25000; 79000; 105000 [USP'U]/1; [USP'U]/1; [USP'U]/1
2 TABLETS CAPSULE, DELAYED RELEASE ORAL
Qty: 540 | Refills: 3 | Status: ACTIVE | COMMUNITY
Start: 2024-02-05 | End: 2025-01-30

## 2024-02-26 RX ORDER — ATORVASTATIN CALCIUM 40 MG/1
1 TABLET TABLET, FILM COATED ORAL ONCE A DAY
Status: ACTIVE | COMMUNITY

## 2024-02-26 RX ORDER — ASPIRIN 81 MG/1
1 TABLET TABLET, CHEWABLE ORAL ONCE A DAY
Status: ACTIVE | COMMUNITY

## 2024-02-26 RX ORDER — CLOPIDOGREL BISULFATE 75 MG/1
1 TABLET TABLET ORAL ONCE A DAY
Status: ACTIVE | COMMUNITY

## 2024-02-26 RX ORDER — UREA 10 %
TAKE ONE TABLET BY MOUTH EVERY DAY LOTION (ML) TOPICAL
Qty: 30 EACH | Refills: 0 | Status: ACTIVE | COMMUNITY

## 2024-02-26 RX ORDER — POLYETHYLENE GLYCOL 3350 17 G/17G
MIX 17G AS DIRECTED AND TAKE BY MOUTH DAILY POWDER, FOR SOLUTION ORAL
Qty: 510 GRAM | Refills: 0 | Status: ACTIVE | COMMUNITY

## 2024-02-26 RX ORDER — ERTAPENEM SODIUM 1 G/1
INJECTION, POWDER, LYOPHILIZED, FOR SOLUTION INTRAMUSCULAR; INTRAVENOUS
Qty: 12 EACH | Status: ACTIVE | COMMUNITY

## 2024-03-08 ENCOUNTER — LAB (OUTPATIENT)
Dept: URBAN - METROPOLITAN AREA MEDICAL CENTER 19 | Facility: MEDICAL CENTER | Age: 79
End: 2024-03-08
Payer: MEDICARE

## 2024-03-08 DIAGNOSIS — K92.1 MELENA: ICD-10-CM

## 2024-03-08 DIAGNOSIS — D62 ACUTE POSTHEMORRHAGIC ANEMIA: ICD-10-CM

## 2024-03-08 DIAGNOSIS — D68.4 ACQUIRED COAGULATION FACTOR DEFICIENCY: ICD-10-CM

## 2024-03-08 DIAGNOSIS — C25.9 MALIGNANT NEOPLASM OF PANCREAS, UNSPECIFIED: ICD-10-CM

## 2024-03-08 PROCEDURE — 99222 1ST HOSP IP/OBS MODERATE 55: CPT | Performed by: INTERNAL MEDICINE

## 2024-03-09 ENCOUNTER — LAB (OUTPATIENT)
Dept: URBAN - METROPOLITAN AREA MEDICAL CENTER 19 | Facility: MEDICAL CENTER | Age: 79
End: 2024-03-09
Payer: MEDICARE

## 2024-03-09 DIAGNOSIS — K56.690 OTHER PARTIAL INTESTINAL OBSTRUCTION: ICD-10-CM

## 2024-03-09 DIAGNOSIS — K92.2 GASTROINTESTINAL HEMORRHAGE, UNSPECIFIED GASTROINTESTINAL HEMORRHAGE TYPE: ICD-10-CM

## 2024-03-09 DIAGNOSIS — D62 ACUTE POSTHEMORRHAGIC ANEMIA: ICD-10-CM

## 2024-03-09 DIAGNOSIS — C25.9 MALIGNANT NEOPLASM OF PANCREAS, UNSPECIFIED LOCATION OF MALIGNANCY: ICD-10-CM

## 2024-03-09 PROCEDURE — 99232 SBSQ HOSP IP/OBS MODERATE 35: CPT | Performed by: INTERNAL MEDICINE

## 2024-03-10 ENCOUNTER — LAB (OUTPATIENT)
Dept: URBAN - METROPOLITAN AREA MEDICAL CENTER 19 | Facility: MEDICAL CENTER | Age: 79
End: 2024-03-10
Payer: MEDICARE

## 2024-03-10 DIAGNOSIS — K56.690 OTHER PARTIAL INTESTINAL OBSTRUCTION: ICD-10-CM

## 2024-03-10 DIAGNOSIS — C25.9 MALIGNANT NEOPLASM OF PANCREAS, UNSPECIFIED: ICD-10-CM

## 2024-03-10 DIAGNOSIS — K92.2 GASTROINTESTINAL HEMORRHAGE, UNSPECIFIED: ICD-10-CM

## 2024-03-10 DIAGNOSIS — D62 ACUTE POSTHEMORRHAGIC ANEMIA: ICD-10-CM

## 2024-03-10 PROCEDURE — 99232 SBSQ HOSP IP/OBS MODERATE 35: CPT | Performed by: INTERNAL MEDICINE

## 2024-03-11 ENCOUNTER — LAB (OUTPATIENT)
Dept: URBAN - METROPOLITAN AREA MEDICAL CENTER 19 | Facility: MEDICAL CENTER | Age: 79
End: 2024-03-11
Payer: MEDICARE

## 2024-03-11 DIAGNOSIS — C25.9 MALIGNANT NEOPLASM OF PANCREAS, UNSPECIFIED: ICD-10-CM

## 2024-03-11 DIAGNOSIS — T39.395A ADVERSE EFFECT OF OTHER NONSTEROIDAL ANTI-INFLAMMATORY DRUGS [NSAID], INITIAL ENCOUNTER: ICD-10-CM

## 2024-03-11 DIAGNOSIS — D62 ACUTE POSTHEMORRHAGIC ANEMIA: ICD-10-CM

## 2024-03-11 DIAGNOSIS — K92.2 GASTROINTESTINAL HEMORRHAGE, UNSPECIFIED: ICD-10-CM

## 2024-03-11 DIAGNOSIS — K56.690 OTHER PARTIAL INTESTINAL OBSTRUCTION: ICD-10-CM

## 2024-03-11 PROCEDURE — 99232 SBSQ HOSP IP/OBS MODERATE 35: CPT | Performed by: INTERNAL MEDICINE

## 2024-03-12 ENCOUNTER — LAB (OUTPATIENT)
Dept: URBAN - METROPOLITAN AREA MEDICAL CENTER 19 | Facility: MEDICAL CENTER | Age: 79
End: 2024-03-12
Payer: MEDICARE

## 2024-03-12 DIAGNOSIS — D62 ACUTE POSTHEMORRHAGIC ANEMIA: ICD-10-CM

## 2024-03-12 DIAGNOSIS — K92.2 GASTROINTESTINAL HEMORRHAGE, UNSPECIFIED GASTROINTESTINAL HEMORRHAGE TYPE: ICD-10-CM

## 2024-03-12 DIAGNOSIS — C78.7 METASTASIS TO LIVER: ICD-10-CM

## 2024-03-12 DIAGNOSIS — K56.690 OTHER PARTIAL INTESTINAL OBSTRUCTION: ICD-10-CM

## 2024-03-12 DIAGNOSIS — C25.9 MALIGNANT NEOPLASM OF PANCREAS, UNSPECIFIED LOCATION OF MALIGNANCY: ICD-10-CM

## 2024-03-12 PROCEDURE — 99232 SBSQ HOSP IP/OBS MODERATE 35: CPT | Performed by: INTERNAL MEDICINE
